# Patient Record
Sex: FEMALE | Race: OTHER | HISPANIC OR LATINO | URBAN - METROPOLITAN AREA
[De-identification: names, ages, dates, MRNs, and addresses within clinical notes are randomized per-mention and may not be internally consistent; named-entity substitution may affect disease eponyms.]

---

## 2021-10-15 ENCOUNTER — EMERGENCY (EMERGENCY)
Facility: HOSPITAL | Age: 31
LOS: 1 days | Discharge: HOME | End: 2021-10-15
Attending: EMERGENCY MEDICINE | Admitting: EMERGENCY MEDICINE
Payer: SELF-PAY

## 2021-10-15 VITALS
OXYGEN SATURATION: 100 % | HEART RATE: 76 BPM | SYSTOLIC BLOOD PRESSURE: 137 MMHG | DIASTOLIC BLOOD PRESSURE: 86 MMHG | RESPIRATION RATE: 18 BRPM

## 2021-10-15 VITALS
DIASTOLIC BLOOD PRESSURE: 80 MMHG | SYSTOLIC BLOOD PRESSURE: 121 MMHG | RESPIRATION RATE: 17 BRPM | OXYGEN SATURATION: 99 % | HEART RATE: 71 BPM

## 2021-10-15 DIAGNOSIS — T43.641A POISONING BY ECSTASY, ACCIDENTAL (UNINTENTIONAL), INITIAL ENCOUNTER: ICD-10-CM
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
## 2021-10-15 DIAGNOSIS — R11.2 NAUSEA WITH VOMITING, UNSPECIFIED: ICD-10-CM
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
## 2021-10-15 DIAGNOSIS — F17.200 NICOTINE DEPENDENCE, UNSPECIFIED, UNCOMPLICATED: ICD-10-CM

## 2021-10-15 DIAGNOSIS — R51.9 HEADACHE, UNSPECIFIED: ICD-10-CM

## 2021-10-15 PROCEDURE — 93010 ELECTROCARDIOGRAM REPORT: CPT

## 2021-10-15 PROCEDURE — 99053 MED SERV 10PM-8AM 24 HR FAC: CPT

## 2021-10-15 PROCEDURE — 93005 ELECTROCARDIOGRAM TRACING: CPT

## 2021-10-15 PROCEDURE — 96374 THER/PROPH/DIAG INJ IV PUSH: CPT

## 2021-10-15 PROCEDURE — 99284 EMERGENCY DEPT VISIT MOD MDM: CPT

## 2021-10-15 PROCEDURE — 96375 TX/PRO/DX INJ NEW DRUG ADDON: CPT

## 2021-10-15 PROCEDURE — 99285 EMERGENCY DEPT VISIT HI MDM: CPT | Mod: 25

## 2021-10-15 RX ORDER — METOCLOPRAMIDE HCL 10 MG
10 TABLET ORAL ONCE
Refills: 0 | Status: COMPLETED | OUTPATIENT
Start: 2021-10-15 | End: 2021-10-15

## 2021-10-15 RX ADMIN — Medication 0.5 MILLIGRAM(S): at 04:32

## 2021-10-15 RX ADMIN — Medication 104 MILLIGRAM(S): at 04:32

## 2021-10-15 NOTE — ED ADULT NURSE NOTE - OBJECTIVE STATEMENT
Pt is 32 yo F presented to er with c/o headache, n/v since 2am. Pt denies cp, sob, fever, chills. Pt A&Ox4, ambulatory with steady gait, speaking in clear/full sentences, no acute distress, vital signs stable.

## 2021-10-15 NOTE — ED PROVIDER NOTE - NSFOLLOWUPINSTRUCTIONS_ED_ALL_ED_FT
Hallucinogen Use Disorder    Hallucinogens are a group of drugs that cause people to see and hear things that are not there (hallucinations). These drugs may be made from chemicals (synthetic) or may occur naturally. Examples of hallucinogens include:  •MDMA (Ecstasy).    •PCP.    •LSD.    •Psilocybin mushrooms (shrooms or magic mushrooms).    •Peyote.    In addition to hallucinations, people who use these drugs may experience:  •A feeling of being  from their body or mind.    •Lost awareness of time and place.    •Paranoia.    •Panic.    •Increased body temperature, blood pressure, and breathing.    Hallucinogen use disorder is long-term use of hallucinogens that interferes with normal life activities. The condition can cause health problems, such as:  •Memory loss.    •Anxiety, depression, paranoia, and thoughts of suicide (suicide ideation).    •Speech difficulty.    •Flashbacks.    What are the signs or symptoms?  Symptoms of this condition include:  •Constantly wanting to use the drug.    •Being unable to slow down or stop your use of the drug.    •Spending an abnormal amount of time getting the drug, using the drug, or recovering from using the drug.    •Hallucinogen use that interferes with work, school, social activities, and personal relationships.    •Using the drug even after having negative consequences, such as:  •Health problems.    •Legal or financial troubles.    •Job loss.    •Broken relationships.    •Needing more and more of the drug to get the same effect (developing tolerance).    •Experiencing unpleasant symptoms if you do not use the drug (withdrawal). Signs of withdrawal include:  •Headache.    •Fatigue.    •Depression.    •Using the drug to avoid withdrawal.    How is this diagnosed?  This condition is diagnosed based on:  •A physical exam.    •Your history of substance abuse.    •Your symptoms. This includes:  •How hallucinogen use affects your life.    •Changes in personality, behaviors, and mood.    •Having at least two symptoms of hallucinogen use disorder within a 12-month period.    •Health issues related to using hallucinogens.    •Blood or urine tests to screen for drugs.    How is this treated?  If you take too much of a drug at one time, you may need treatment in a hospital or a treatment center for your safety. Long-term treatment of hallucinogen use disorder may include:  •Group and individual counseling from mental health providers who help people with hallucinogen use disorder.    •Staying at a live-in (residential) treatment center for several days or weeks.    •Medicines to treat depression or anxiety.    •Support groups with others who are going through the same thing.    Recovery can be a long process. Many people who undergo treatment start using the drug again after stopping (relapse). If you relapse, it does not mean that treatment will not work.    Follow these instructions at home:    •Take over-the-counter and prescription medicines only as told by your health care provider.    • Do not use any drugs or alcohol.    •Avoid situations where drugs or alcohol are available, or you will be tempted to use the drug.    •Find healthy ways to cope with stress. This may include:  •Deep breathing, meditation, or exercise.     •Listening to music or doing an artistic hobby.    •Talking with someone you trust.    •Attend support groups as needed. These groups are an important part of long-term recovery for many people.    •Keep all follow-up visits as told by your health care providers. This is important. This includes continuing to work with therapists and support groups.    Contact a health care provider if:    •Your symptoms get worse or you develop new symptoms.    •You have trouble resisting the urge to use drugs.    •You continue to have hallucinations after stopping use.    •You are not able to take medicines as told.    Get help right away if:    •You relapse.    •You have signs of a hallucinogen overdose such as:  •Shaking.    •Seizure.    •Chest pain.    •Dark urine.    •Sore muscles.    •You have serious thoughts about hurting yourself or someone else.    If you ever feel like you may hurt yourself or others, or have thoughts about taking your own life, get help right away. You can go to your nearest emergency department or call:   • Your local emergency services (911 in the [place]     • A suicide crisis helpline, such as the National Suicide Prevention Lifeline at 1-147.191.2804. This is open 24 hours a day.     Summary    •Hallucinogens are a group of drugs that cause people to see and hear things that are not there (hallucinations). These drugs may be made from chemicals (synthetic) or may occur naturally. Hallucinogen use disorder is long-term use of hallucinogens that interferes with normal life activities or causes health problems.    •Stopping drug use and taking part in group and individual counseling from mental health providers can help treat people with hallucinogen use disorder.    •Recovery can be a long process. Many people who undergo treatment start using the drug again after stopping (relapse). A relapse does not mean that treatment will not work.    •Attend support groups as needed. These groups are an important part of long-term recovery for many people.    This information is not intended to replace advice given to you by your health care provider. Make sure you discuss any questions you have with your health care provider.

## 2021-10-15 NOTE — ED PROVIDER NOTE - OBJECTIVE STATEMENT
31F no PMH c/o feeling unwell after taking ecstasy tonight. pt states she took 2 pills around 7P instead of usual 1 and began feeling unwell around 2A.  feeling nauseated, +vomiting. c/o HA. feeling dizzy. no abd pain. no chest pain. no SOB.

## 2021-10-15 NOTE — ED PROVIDER NOTE - CLINICAL SUMMARY MEDICAL DECISION MAKING FREE TEXT BOX
states she ingested bad ecstasy last night, c/o nausea, vomiting, headache. no focal neuro deficits on exam, denies any head trauma. no abd pain, no guarding, no rebound. no chest pain  -check ekg  -zofran  -ativan  given 1L NS by EMS.

## 2021-10-15 NOTE — ED ADULT NURSE NOTE - NS PRO PASSIVE SMOKE EXP
At 530pm tonight patient stepped on a dog toy and twisted her left ankle. Patient states she took 400 mg x3 doses since 630 pm tonight. Patient states she cannot bear any weight on the ankle and felt something 'crack and pop'  
Unknown

## 2021-10-15 NOTE — ED PROVIDER NOTE - PATIENT PORTAL LINK FT
You can access the FollowMyHealth Patient Portal offered by [facility] by registering at the following website: [email or web address]. By joining [facility] [employer]’s FollowMyHealth portal, you will also be able to view your health information using other applications (apps) compatible with our system.

## 2023-06-08 NOTE — ED PROVIDER NOTE - SKIN, MLM
Skin normal color for race, warm, dry and intact. No evidence of rash.
no pressure injuries; noted redness on R wrist